# Patient Record
Sex: FEMALE | Race: WHITE | NOT HISPANIC OR LATINO | Employment: FULL TIME | URBAN - METROPOLITAN AREA
[De-identification: names, ages, dates, MRNs, and addresses within clinical notes are randomized per-mention and may not be internally consistent; named-entity substitution may affect disease eponyms.]

---

## 2020-10-07 ENCOUNTER — OFFICE VISIT (OUTPATIENT)
Dept: GASTROENTEROLOGY | Facility: CLINIC | Age: 50
End: 2020-10-07
Payer: COMMERCIAL

## 2020-10-07 VITALS
HEIGHT: 65 IN | WEIGHT: 122 LBS | DIASTOLIC BLOOD PRESSURE: 82 MMHG | SYSTOLIC BLOOD PRESSURE: 116 MMHG | TEMPERATURE: 96.7 F | HEART RATE: 59 BPM | BODY MASS INDEX: 20.33 KG/M2

## 2020-10-07 DIAGNOSIS — R19.5 LOOSE STOOLS: ICD-10-CM

## 2020-10-07 DIAGNOSIS — R12 HEARTBURN: Primary | ICD-10-CM

## 2020-10-07 DIAGNOSIS — R15.9 INCONTINENCE OF FECES, UNSPECIFIED FECAL INCONTINENCE TYPE: ICD-10-CM

## 2020-10-07 DIAGNOSIS — R13.19 OTHER DYSPHAGIA: ICD-10-CM

## 2020-10-07 DIAGNOSIS — Z12.11 COLON CANCER SCREENING: ICD-10-CM

## 2020-10-07 PROCEDURE — 99204 OFFICE O/P NEW MOD 45 MIN: CPT | Performed by: NURSE PRACTITIONER

## 2020-10-07 RX ORDER — VALACYCLOVIR HYDROCHLORIDE 1 G/1
1000 TABLET, FILM COATED ORAL AS NEEDED
COMMUNITY

## 2020-10-09 ENCOUNTER — TELEMEDICINE (OUTPATIENT)
Dept: GASTROENTEROLOGY | Facility: CLINIC | Age: 50
End: 2020-10-09

## 2020-10-09 VITALS — WEIGHT: 122 LBS | HEIGHT: 65 IN | BODY MASS INDEX: 20.33 KG/M2

## 2020-10-09 DIAGNOSIS — R15.9 INCONTINENCE OF FECES, UNSPECIFIED FECAL INCONTINENCE TYPE: Primary | ICD-10-CM

## 2020-10-09 RX ORDER — SODIUM PICOSULFATE, MAGNESIUM OXIDE, AND ANHYDROUS CITRIC ACID 10; 3.5; 12 MG/160ML; G/160ML; G/160ML
LIQUID ORAL
Qty: 2 BOTTLE | Refills: 0 | Status: SHIPPED | OUTPATIENT
Start: 2020-10-09 | End: 2020-12-10 | Stop reason: ALTCHOICE

## 2020-10-16 ENCOUNTER — HOSPITAL ENCOUNTER (OUTPATIENT)
Dept: GASTROENTEROLOGY | Facility: AMBULATORY SURGERY CENTER | Age: 50
Discharge: HOME | End: 2020-10-16
Payer: COMMERCIAL

## 2020-10-16 ENCOUNTER — ANESTHESIA EVENT (OUTPATIENT)
Dept: GASTROENTEROLOGY | Facility: AMBULATORY SURGERY CENTER | Age: 50
End: 2020-10-16

## 2020-10-16 ENCOUNTER — ANESTHESIA (OUTPATIENT)
Dept: GASTROENTEROLOGY | Facility: AMBULATORY SURGERY CENTER | Age: 50
End: 2020-10-16

## 2020-10-16 VITALS
SYSTOLIC BLOOD PRESSURE: 134 MMHG | OXYGEN SATURATION: 100 % | HEART RATE: 58 BPM | RESPIRATION RATE: 15 BRPM | TEMPERATURE: 97.5 F | DIASTOLIC BLOOD PRESSURE: 93 MMHG

## 2020-10-16 VITALS — HEART RATE: 54 BPM

## 2020-10-16 DIAGNOSIS — R12 HEARTBURN: ICD-10-CM

## 2020-10-16 DIAGNOSIS — R13.19 OTHER DYSPHAGIA: ICD-10-CM

## 2020-10-16 DIAGNOSIS — Z12.11 COLON CANCER SCREENING: ICD-10-CM

## 2020-10-16 PROCEDURE — 45380 COLONOSCOPY AND BIOPSY: CPT | Performed by: INTERNAL MEDICINE

## 2020-10-16 PROCEDURE — 88305 TISSUE EXAM BY PATHOLOGIST: CPT | Performed by: PATHOLOGY

## 2020-10-16 PROCEDURE — 43450 DILATE ESOPHAGUS 1/MULT PASS: CPT | Performed by: INTERNAL MEDICINE

## 2020-10-16 PROCEDURE — 43239 EGD BIOPSY SINGLE/MULTIPLE: CPT | Performed by: INTERNAL MEDICINE

## 2020-10-16 RX ORDER — SODIUM CHLORIDE 9 MG/ML
50 INJECTION, SOLUTION INTRAVENOUS CONTINUOUS
Status: DISCONTINUED | OUTPATIENT
Start: 2020-10-16 | End: 2020-10-16

## 2020-10-16 RX ORDER — PROPOFOL 10 MG/ML
INJECTION, EMULSION INTRAVENOUS AS NEEDED
Status: DISCONTINUED | OUTPATIENT
Start: 2020-10-16 | End: 2020-10-16

## 2020-10-16 RX ORDER — LIDOCAINE HYDROCHLORIDE 10 MG/ML
INJECTION, SOLUTION EPIDURAL; INFILTRATION; INTRACAUDAL; PERINEURAL AS NEEDED
Status: DISCONTINUED | OUTPATIENT
Start: 2020-10-16 | End: 2020-10-16

## 2020-10-16 RX ADMIN — SODIUM CHLORIDE 50 ML/HR: 9 INJECTION, SOLUTION INTRAVENOUS at 13:03

## 2020-10-16 RX ADMIN — PROPOFOL 10 MG: 10 INJECTION, EMULSION INTRAVENOUS at 13:21

## 2020-10-16 RX ADMIN — PROPOFOL 20 MG: 10 INJECTION, EMULSION INTRAVENOUS at 13:25

## 2020-10-16 RX ADMIN — PROPOFOL 10 MG: 10 INJECTION, EMULSION INTRAVENOUS at 13:26

## 2020-10-16 RX ADMIN — LIDOCAINE HYDROCHLORIDE 50 MG: 10 INJECTION, SOLUTION EPIDURAL; INFILTRATION; INTRACAUDAL; PERINEURAL at 13:10

## 2020-10-16 RX ADMIN — PROPOFOL 30 MG: 10 INJECTION, EMULSION INTRAVENOUS at 13:20

## 2020-10-16 RX ADMIN — PROPOFOL 20 MG: 10 INJECTION, EMULSION INTRAVENOUS at 13:30

## 2020-10-16 RX ADMIN — PROPOFOL 20 MG: 10 INJECTION, EMULSION INTRAVENOUS at 13:32

## 2020-10-16 RX ADMIN — PROPOFOL 30 MG: 10 INJECTION, EMULSION INTRAVENOUS at 13:12

## 2020-10-16 RX ADMIN — PROPOFOL 30 MG: 10 INJECTION, EMULSION INTRAVENOUS at 13:11

## 2020-10-16 RX ADMIN — PROPOFOL 50 MG: 10 INJECTION, EMULSION INTRAVENOUS at 13:10

## 2020-10-16 RX ADMIN — PROPOFOL 20 MG: 10 INJECTION, EMULSION INTRAVENOUS at 13:28

## 2020-10-16 RX ADMIN — PROPOFOL 30 MG: 10 INJECTION, EMULSION INTRAVENOUS at 13:14

## 2020-10-16 RX ADMIN — PROPOFOL 30 MG: 10 INJECTION, EMULSION INTRAVENOUS at 13:17

## 2020-10-16 RX ADMIN — PROPOFOL 30 MG: 10 INJECTION, EMULSION INTRAVENOUS at 13:22

## 2020-12-10 ENCOUNTER — TELEMEDICINE (OUTPATIENT)
Dept: GASTROENTEROLOGY | Facility: CLINIC | Age: 50
End: 2020-12-10
Payer: COMMERCIAL

## 2020-12-10 VITALS — BODY MASS INDEX: 20.99 KG/M2 | WEIGHT: 126 LBS | HEIGHT: 65 IN

## 2020-12-10 DIAGNOSIS — K20.80 LYMPHOCYTIC ESOPHAGITIS: ICD-10-CM

## 2020-12-10 DIAGNOSIS — R19.5 LOOSE STOOLS: ICD-10-CM

## 2020-12-10 DIAGNOSIS — R13.19 OTHER DYSPHAGIA: ICD-10-CM

## 2020-12-10 DIAGNOSIS — R15.9 INCONTINENCE OF FECES, UNSPECIFIED FECAL INCONTINENCE TYPE: ICD-10-CM

## 2020-12-10 DIAGNOSIS — Z86.010 HISTORY OF COLON POLYPS: ICD-10-CM

## 2020-12-10 DIAGNOSIS — K21.9 GASTROESOPHAGEAL REFLUX DISEASE WITHOUT ESOPHAGITIS: Primary | ICD-10-CM

## 2020-12-10 PROBLEM — Z86.0100 HISTORY OF COLON POLYPS: Status: ACTIVE | Noted: 2020-12-10

## 2020-12-10 PROCEDURE — 99214 OFFICE O/P EST MOD 30 MIN: CPT | Performed by: NURSE PRACTITIONER

## 2020-12-10 RX ORDER — FAMOTIDINE 40 MG/1
40 TABLET, FILM COATED ORAL DAILY PRN
Qty: 30 TABLET | Refills: 3 | Status: SHIPPED | OUTPATIENT
Start: 2020-12-10 | End: 2021-03-08

## 2021-03-06 DIAGNOSIS — K21.9 GASTROESOPHAGEAL REFLUX DISEASE WITHOUT ESOPHAGITIS: ICD-10-CM

## 2021-03-08 RX ORDER — FAMOTIDINE 40 MG/1
TABLET, FILM COATED ORAL
Qty: 90 TABLET | Refills: 1 | Status: SHIPPED | OUTPATIENT
Start: 2021-03-08 | End: 2022-07-21

## 2022-07-21 ENCOUNTER — PATIENT MESSAGE (OUTPATIENT)
Dept: OBGYN CLINIC | Facility: CLINIC | Age: 52
End: 2022-07-21

## 2022-07-21 ENCOUNTER — ANNUAL EXAM (OUTPATIENT)
Dept: OBGYN CLINIC | Facility: CLINIC | Age: 52
End: 2022-07-21
Payer: COMMERCIAL

## 2022-07-21 VITALS
BODY MASS INDEX: 22.99 KG/M2 | WEIGHT: 138 LBS | HEIGHT: 65 IN | SYSTOLIC BLOOD PRESSURE: 102 MMHG | DIASTOLIC BLOOD PRESSURE: 68 MMHG

## 2022-07-21 DIAGNOSIS — Z12.4 CERVICAL CANCER SCREENING: ICD-10-CM

## 2022-07-21 DIAGNOSIS — N95.2 ATROPHIC VAGINITIS: Primary | ICD-10-CM

## 2022-07-21 DIAGNOSIS — K58.9 IRRITABLE BOWEL SYNDROME, UNSPECIFIED TYPE: ICD-10-CM

## 2022-07-21 DIAGNOSIS — Z12.31 ENCOUNTER FOR SCREENING MAMMOGRAM FOR MALIGNANT NEOPLASM OF BREAST: ICD-10-CM

## 2022-07-21 DIAGNOSIS — N95.2 ATROPHIC VAGINITIS: ICD-10-CM

## 2022-07-21 DIAGNOSIS — Z01.419 ENCOUNTER FOR GYNECOLOGICAL EXAMINATION WITHOUT ABNORMAL FINDING: Primary | ICD-10-CM

## 2022-07-21 PROCEDURE — 99396 PREV VISIT EST AGE 40-64: CPT | Performed by: OBSTETRICS & GYNECOLOGY

## 2022-07-21 PROCEDURE — 0503F POSTPARTUM CARE VISIT: CPT | Performed by: OBSTETRICS & GYNECOLOGY

## 2022-07-21 RX ORDER — ESTRADIOL 0.1 MG/G
CREAM VAGINAL
Qty: 30 G | Refills: 1 | Status: SHIPPED | OUTPATIENT
Start: 2022-07-21 | End: 2022-07-22

## 2022-07-21 NOTE — PROGRESS NOTES
[id number]   [address], [address], [facility], [place]    ASSESSMENT/PLAN: Kashmir Naidu is a 45 y o  No obstetric history on file  who presents for annual gynecologic exam     Encounter for routine gynecologic examination  - Routine well woman exam completed today  - Cervical Cancer Screening: Current ASCCP Guidelines reviewed  Last Pap: 03/28/2021   Next Pap Due: today  - Contraceptive counseling discussed  Current contraception: none:   - Breast Cancer Screening: Last Mammogram  Order given   - Colorectal cancer screening was not ordered  - The following were reviewed in today's visit: breast self exam, mammography screening ordered, menopause, adequate intake of calcium and vitamin D, exercise, healthy diet and colonoscopy discussed     Additional problems addressed during this visit:  1  Encounter for gynecological examination without abnormal finding    2  Encounter for screening mammogram for malignant neoplasm of breast  -     Mammo screening bilateral w 3d & cad; Future; Expected date: 07/21/2023    3  Irritable bowel syndrome, unspecified type    4  Cervical cancer screening  -     IGP, Aptima HPV, Rfx 16/18,45        CC:  Annual Gynecologic Examination    HPI: Kashmir Naidu is a 45 y o  No obstetric history on file  who presents for annual gynecologic examination  44 yo here for wellness exam   No bleeding, bloating or satiety  + painful intercourse   Used  Compounded estrogen   Butstopped  The following portions of the patient's history were reviewed and updated as appropriate: She  has a past medical history of Herpes simplex, History of screening mammography (12/04/2019), and Irritable bowel syndrome  She  has a past surgical history that includes Liposuction; Colonoscopy (10/16/2020); and Upper gastrointestinal endoscopy (10/16/2020)  Her family history is not on file  She  reports that she has quit smoking   She has never used smokeless tobacco  She reports current alcohol use  She reports that she does not use drugs  Current Outpatient Medications   Medication Sig Dispense Refill    valACYclovir (VALTREX) 1,000 mg tablet Take 1,000 mg by mouth as needed       No current facility-administered medications for this visit  She has No Known Allergies       Review of Systems:  All systems normal except as noted in HPI          Objective:  /68   Ht 5' 4 5" (1 638 m)   Wt 62 6 kg (138 lb)   LMP 10/16/2017   BMI 23 32 kg/m²    Physical Exam  Vitals and nursing note reviewed  Constitutional:       Appearance: Normal appearance  HENT:      Head: Normocephalic  Cardiovascular:      Rate and Rhythm: Normal rate and regular rhythm  Pulses: Normal pulses  Pulmonary:      Effort: Pulmonary effort is normal       Breath sounds: Normal breath sounds  Chest:      Chest wall: No mass, lacerations, swelling, tenderness or edema  Breasts: Rogers Score is 4  Breasts are symmetrical       Right: Normal  No swelling, bleeding, inverted nipple, mass, nipple discharge, skin change, tenderness, axillary adenopathy or supraclavicular adenopathy  Left: No swelling, bleeding, inverted nipple, mass, nipple discharge, skin change, tenderness, axillary adenopathy or supraclavicular adenopathy  Abdominal:      General: Abdomen is flat  Bowel sounds are normal       Palpations: Abdomen is soft  Genitourinary:     General: Normal vulva  Exam position: Lithotomy position  Pubic Area: No rash  Rogers stage (genital): 4       Labia:         Right: No rash, tenderness or lesion  Left: No rash, tenderness or lesion  Urethra: No urethral pain, urethral swelling or urethral lesion  Vagina: Tenderness present  No erythema  Cervix: No cervical motion tenderness        Uterus: Normal        Adnexa: Right adnexa normal and left adnexa normal       Rectum: Normal       Comments: vagina  Pale  Pink    Tight   introitus Musculoskeletal:         General: Normal range of motion  Cervical back: Neck supple  Lymphadenopathy:      Upper Body:      Right upper body: No supraclavicular, axillary or pectoral adenopathy  Left upper body: No supraclavicular, axillary or pectoral adenopathy  Lower Body: No right inguinal adenopathy  No left inguinal adenopathy  Skin:     General: Skin is warm and dry  Neurological:      General: No focal deficit present  Mental Status: She is alert and oriented to person, place, and time  Psychiatric:         Mood and Affect: Mood normal          Behavior: Behavior normal          Thought Content:  Thought content normal          Judgment: Judgment normal

## 2022-07-22 RX ORDER — ESTRADIOL 0.1 MG/G
CREAM VAGINAL
Qty: 30 G | Refills: 3 | Status: SHIPPED | OUTPATIENT
Start: 2022-07-22

## 2022-07-28 LAB
CYTOLOGIST CVX/VAG CYTO: NORMAL
DX ICD CODE: NORMAL
HPV I/H RISK 4 DNA CVX QL PROBE+SIG AMP: NEGATIVE
OTHER STN SPEC: NORMAL
PATH REPORT.FINAL DX SPEC: NORMAL
SL AMB NOTE:: NORMAL
SL AMB SPECIMEN ADEQUACY: NORMAL
SL AMB TEST METHODOLOGY: NORMAL

## 2022-08-18 DIAGNOSIS — Z12.31 ENCOUNTER FOR SCREENING MAMMOGRAM FOR MALIGNANT NEOPLASM OF BREAST: ICD-10-CM

## 2023-08-24 ENCOUNTER — OFFICE VISIT (OUTPATIENT)
Dept: GASTROENTEROLOGY | Facility: CLINIC | Age: 53
End: 2023-08-24
Payer: COMMERCIAL

## 2023-08-24 VITALS
HEIGHT: 65 IN | BODY MASS INDEX: 18.83 KG/M2 | TEMPERATURE: 98.3 F | HEART RATE: 56 BPM | DIASTOLIC BLOOD PRESSURE: 75 MMHG | SYSTOLIC BLOOD PRESSURE: 105 MMHG | OXYGEN SATURATION: 100 % | WEIGHT: 113 LBS

## 2023-08-24 DIAGNOSIS — K76.0 FATTY LIVER: ICD-10-CM

## 2023-08-24 DIAGNOSIS — R79.89 LFTS ABNORMAL: Primary | ICD-10-CM

## 2023-08-24 DIAGNOSIS — F10.11 HISTORY OF ALCOHOL ABUSE: ICD-10-CM

## 2023-08-24 PROCEDURE — 99213 OFFICE O/P EST LOW 20 MIN: CPT | Performed by: INTERNAL MEDICINE

## 2023-08-24 RX ORDER — FLUTICASONE PROPIONATE 50 MCG
1 SPRAY, SUSPENSION (ML) NASAL DAILY
COMMUNITY

## 2023-08-24 NOTE — PROGRESS NOTES
[facility] - Outpatient Follow-up Note  Michelle Byers 52 y.o. female MRN: [id number]  Encounter: [id number]          ASSESSMENT AND PLAN:      Mildly elevated transaminases in the 40s with mild hepatomegaly on ultrasound. · Patient has no physical, laboratory or radiologic evidence of advanced liver disease or cirrhosis. · Patient has a long history of alcohol overuse, finally quitting in September of last year. · At this point in time, I will request blood work to rule out more common causes of chronic liver disease including viral and autoimmune hepatitis, alpha-1 antitrypsin deficiency and hemochromatosis. · I will also request elastography to noninvasively stage hepatic fibrosis and rule out advanced fibrosis in a patient that has had long-term alcohol overuse. · I counseled her to continue her healthy lifestyle and avoid alcohol. · I did inform her that Valtrex may cause elevated transaminases and to keep track of when she takes it in relation to future blood work. · She will return in 6 weeks to review the results. FOLLOW-UP:  Return in about 6 weeks (around 10/5/2023). VISIT DIAGNOSES AND ORDERS:      1. LFTs abnormal    2. Fatty liver    3.  History of alcohol abuse      Orders Placed This Encounter   Procedures   • US elastography   • CBC and differential   • Protime-INR   • JOSE Screen w/ Reflex to Titer/Pattern   • Antimitochondrial antibody   • Anti-smooth muscle antibody, IgG   • Hepatitis panel, acute   • Hepatitis B surface antibody   • Hepatitis A antibody, total   • Celiac Disease Antibody Profile   • IgG, IgA, IgM   • Alpha 1 Antitrypsin Phenotype   • HIDALGO Fibrosure   • Comprehensive metabolic panel   • Lipid panel     ______________________________________________________________________    SUBJECTIVE:         Ms. Michelle Byers is a 52 y.o. female with medical history as outlined below including a right bowel syndrome and a personal history of colon emma, who comes in today for initial consultation with hepatology after being found to have mildly elevated transaminases by her PCP. Blood work drawn on July 27, 2023 showed AST of 42 and an ALT of 47 with otherwise normal comprehensive metabolic panel. She had a subsequent abdominal ultrasound which showed her liver to be mildly enlarged without any clear evidence of fatty appearing liver. Patient states she had a history of heavy alcohol use for 10 years until September of last year. She states she drank 1-1-1/2 bottles of wine daily. She states she routinely follows with her PCP and annual blood work has never shown elevated liver enzymes until this most recent blood work. Ms. Piedad Moses denies recent or history of yellow eyes/skin, dark urine, GI bleeding, abdominal distention with fluid, lower extremity swelling, easy bruising, excessive bleeding, pruritus or confusion. She has a history of GERD and dysphagia and was last seen by her [facility] in late 2020. Fortunately she has not had any recurrence of the symptoms since that time. She does still complain of irritable bowel syndrome. Her last colonoscopy was in October 2020 showing a sessile polyp in the rectum, recall placed for 2030. She uses Valtrex as needed for cold sores but otherwise denies any prescribed medication other than vitamins. She denies over-the-counter medication use. She denies herbs, holistic medications or recreational drug use. Does not recall using Valtrex in a short period of time before having her blood work drawn. REVIEW OF SYSTEMS     Review of Systems   All other systems reviewed and are negative.       Historical Information   Patient Active Problem List   Diagnosis   • Irritable bowel syndrome   • History of colon polyps     Social History     Substance and Sexual Activity   Alcohol Use Yes    Comment: social     Social History     Substance and Sexual Activity   Drug Use Not Currently Social History     Tobacco Use   Smoking Status Former   Smokeless Tobacco Never       Meds/Allergies       Current Outpatient Medications:   •  fluticasone (FLONASE) 50 mcg/act nasal spray  •  valACYclovir (VALTREX) 1,000 mg tablet  •  estradiol (ESTRACE) 0.1 mg/g vaginal cream    No Known Allergies        Objective     Blood pressure 105/75, pulse 56, temperature 98.3 °F (36.8 °C), temperature source Oral, height 5' 5" (1.651 m), weight 51.3 kg (113 lb), last menstrual period 10/16/2017, SpO2 100 %. Body mass index is 18.8 kg/m². PHYSICAL EXAM:      Physical Exam  Vitals reviewed. Constitutional:       General: She is not in acute distress. Appearance: Normal appearance. She is not ill-appearing. HENT:      Head: Normocephalic and atraumatic. Nose: Nose normal.      Mouth/Throat:      Mouth: Mucous membranes are moist.      Pharynx: Oropharynx is clear. Eyes:      General: No scleral icterus. Extraocular Movements: Extraocular movements intact. Cardiovascular:      Rate and Rhythm: Normal rate and regular rhythm. Heart sounds: No murmur heard. Pulmonary:      Effort: Pulmonary effort is normal. No respiratory distress. Breath sounds: Normal breath sounds. Abdominal:      General: Abdomen is flat. Palpations: Abdomen is soft. There is no shifting dullness, fluid wave, hepatomegaly or splenomegaly. Tenderness: There is no abdominal tenderness. Hernia: No hernia is present. Genitourinary:     Comments: deferred  Musculoskeletal:         General: No swelling. Normal range of motion. Cervical back: Normal range of motion and neck supple. No tenderness. Skin:     General: Skin is warm. Coloration: Skin is not jaundiced. Findings: No bruising or rash. Neurological:      General: No focal deficit present. Mental Status: She is alert and oriented to person, place, and time.    Psychiatric:         Mood and Affect: Mood normal.         Lab Results:   No results found for: "NA", "K", "CO2", "CL", "BUN", "CREATININE", "GLUCOSE"  No results found for: "WBC", "HGB", "HCT", "MCV", "PLT"  No results found for: "TP", "AST", "ALT", "BILITOT", "BILIDIR", "INR"   No results found for: "AFP", "IRON", "Ehrhardt Crutch", "FERRITIN"  No results found for: "CHOL", "HDL", "TRIG", "LDL"      Radiology Results:   No results found.       Mj Alcala MD

## 2023-08-24 NOTE — PATIENT INSTRUCTIONS
Have the blood work done at your earliest convenience. Schedule an ultrasound elastography for determining liver scarring. Continue with alcohol avoidance.

## 2023-09-13 ENCOUNTER — HOSPITAL ENCOUNTER (OUTPATIENT)
Dept: ULTRASOUND IMAGING | Facility: HOSPITAL | Age: 53
Discharge: HOME | End: 2023-09-13
Attending: INTERNAL MEDICINE
Payer: COMMERCIAL

## 2023-09-13 DIAGNOSIS — F10.11 HISTORY OF ALCOHOL ABUSE: ICD-10-CM

## 2023-09-13 DIAGNOSIS — R79.89 LFTS ABNORMAL: ICD-10-CM

## 2023-09-13 DIAGNOSIS — K76.0 FATTY LIVER: ICD-10-CM

## 2023-09-13 LAB — HCV AB SER-ACNC: NORMAL

## 2023-09-13 PROCEDURE — 76981 USE PARENCHYMA: CPT

## 2023-09-20 NOTE — RESULT ENCOUNTER NOTE
Dr. Rodney Zamorano,  Ms. Jasmine Radon results were normal and she has been notified via [address] [place] [address].

## 2023-09-22 ENCOUNTER — OFFICE VISIT (OUTPATIENT)
Dept: GASTROENTEROLOGY | Facility: CLINIC | Age: 53
End: 2023-09-22
Payer: COMMERCIAL

## 2023-09-22 VITALS
DIASTOLIC BLOOD PRESSURE: 82 MMHG | HEART RATE: 64 BPM | SYSTOLIC BLOOD PRESSURE: 123 MMHG | BODY MASS INDEX: 18.36 KG/M2 | WEIGHT: 110.2 LBS | HEIGHT: 65 IN

## 2023-09-22 DIAGNOSIS — F10.11 HISTORY OF ALCOHOL ABUSE: ICD-10-CM

## 2023-09-22 DIAGNOSIS — R74.8 ELEVATED LIVER ENZYMES: Primary | ICD-10-CM

## 2023-09-22 DIAGNOSIS — Z12.11 SCREENING FOR COLON CANCER: ICD-10-CM

## 2023-09-22 LAB
A1AT PHENOTYP SERPL-IMP: NORMAL
ALBUMIN SERPL-MCNC: 4.7 G/DL (ref 3.6–5.1)
ALBUMIN/GLOB SERPL: 1.8 (CALC) (ref 1–2.5)
ALP SERPL-CCNC: 83 U/L (ref 37–153)
ALT SERPL-CCNC: 37 U/L (ref 6–29)
ANA SER QL IF: NEGATIVE
AST SERPL-CCNC: 33 U/L (ref 10–35)
BASOPHILS # BLD AUTO: 71 CELLS/UL (ref 0–200)
BASOPHILS NFR BLD AUTO: 1.4 %
BILIRUB SERPL-MCNC: 0.5 MG/DL (ref 0.2–1.2)
BUN SERPL-MCNC: 19 MG/DL (ref 7–25)
BUN/CREAT SERPL: ABNORMAL (CALC) (ref 6–22)
CALCIUM SERPL-MCNC: 10.1 MG/DL (ref 8.6–10.4)
CHLORIDE SERPL-SCNC: 103 MMOL/L (ref 98–110)
CHOLEST SERPL-MCNC: 190 MG/DL
CHOLEST/HDLC SERPL: 2.6 (CALC)
CO2 SERPL-SCNC: 30 MMOL/L (ref 20–32)
CREAT SERPL-MCNC: 0.78 MG/DL (ref 0.5–1.03)
EOSINOPHIL # BLD AUTO: 179 CELLS/UL (ref 15–500)
EOSINOPHIL NFR BLD AUTO: 3.5 %
ERYTHROCYTE [DISTWIDTH] IN BLOOD BY AUTOMATED COUNT: 12.5 % (ref 11–15)
GFR/BSA.PRED SERPLBLD CYS-BASED-ARV: 90 ML/MIN/1.73M2
GLOBULIN SER CALC-MCNC: 2.6 G/DL (CALC) (ref 1.9–3.7)
GLUCOSE SERPL-MCNC: 81 MG/DL (ref 65–99)
HAV AB SER QL IA: REACTIVE
HAV IGM SERPL QL IA: NORMAL
HBV CORE IGM SERPL QL IA: NORMAL
HBV SURFACE AB SERPL IA-ACNC: <5 MIU/ML
HBV SURFACE AG SERPL QL IA: NORMAL
HCT VFR BLD AUTO: 38.3 % (ref 35–45)
HCV AB SERPL QL IA: NORMAL
HDLC SERPL-MCNC: 74 MG/DL
HGB BLD-MCNC: 12.5 G/DL (ref 11.7–15.5)
INR PPP: 1
LDLC SERPL CALC-MCNC: 100 MG/DL (CALC)
LYMPHOCYTES # BLD AUTO: 1387 CELLS/UL (ref 850–3900)
LYMPHOCYTES NFR BLD AUTO: 27.2 %
MCH RBC QN AUTO: 31.3 PG (ref 27–33)
MCHC RBC AUTO-ENTMCNC: 32.6 G/DL (ref 32–36)
MCV RBC AUTO: 95.8 FL (ref 80–100)
MITOCHONDRIA AB SER QL IF: NEGATIVE
MONOCYTES # BLD AUTO: 377 CELLS/UL (ref 200–950)
MONOCYTES NFR BLD AUTO: 7.4 %
NEUTROPHILS # BLD AUTO: 3086 CELLS/UL (ref 1500–7800)
NEUTROPHILS NFR BLD AUTO: 60.5 %
NONHDLC SERPL-MCNC: 116 MG/DL (CALC)
PLATELET # BLD AUTO: 286 THOUSAND/UL (ref 140–400)
PMV BLD REES-ECKER: 11.6 FL (ref 7.5–12.5)
POTASSIUM SERPL-SCNC: 4.1 MMOL/L (ref 3.5–5.3)
PROT SERPL-MCNC: 7.3 G/DL (ref 6.1–8.1)
PROTHROMBIN TIME: 10.5 SEC (ref 9–11.5)
RBC # BLD AUTO: 4 MILLION/UL (ref 3.8–5.1)
SMOOTH MUSCLE AB SER QL IF: NEGATIVE
SODIUM SERPL-SCNC: 141 MMOL/L (ref 135–146)
TRIGL SERPL-MCNC: 70 MG/DL
WBC # BLD AUTO: 5.1 THOUSAND/UL (ref 3.8–10.8)

## 2023-09-22 PROCEDURE — 99213 OFFICE O/P EST LOW 20 MIN: CPT | Performed by: FAMILY MEDICINE

## 2023-09-22 NOTE — PROGRESS NOTES
[facility] - Outpatient Follow-up Note  Michelle Capone 52 y.o. female MRN: [id number]  Encounter: [id number]          ASSESSMENT AND PLAN:      1. Elevated liver enzymes  2. History of alcohol abuse  Patient with previously normal liver function tests noted to have mildly elevated transaminases in July 2023 prompting a US notable for hepatomegaly without obvious evidence of fatty liver disease. Reports a history of excessive alcohol use sober since September 2022. Recent serologies negative for viral hepatitis and AIH. Remainder of serologies including celiac disease panel, HIDALGO FibroSure, quant immunoglobulins, A1AT phenotype and iron studies were not drawn by [facility]. U/S elastography notable for F0-F1 fibrosis. No clinical evidence of chronic liver disease. Fortunately, patient's transaminases have improved on repeat serologies now with a very mildly elevated serum ALT (37). Upon further questioning, patient had a Tdap vaccine 2 weeks prior to first being noticed to have elevated liver enzymes. Although rare, it is possible that this is caused her abnormal liver function tests. Recommend she complete the remainder of her serologic evaluation as previously ordered by Dr. Betsey East. She will also repeat a CMP x1 month. So long as the remainder of her workup is unremarkable and her liver function tests remain stable/normalize, no additional work-up is necessary but would recommend she have her liver function tests monitored q3-6 months with routine labs. Patient also has partial immunity to hepatitis B and would recommend she receive a hepatitis B booster either through her PCP or local pharmacy. In the meantime, she is aware to maintain a healthy weight, avoid hepatotoxic medications and continue with complete alcohol abstinence. - Hepatic function panel; Future  - Hepatic function panel    3.  Screening for colon cancer  Patient is up-to-date with her CRC screening. Colonoscopy (10/2020) notable for 1 polyp (hyperplastic). Recommended repeat x10 years. Follow-up PRN pending serologies x1 month.     ______________________________________________________________________    SUBJECTIVE: Patient is a 52 y.o. female with PMH significant for colonic polyps, IBS, cold sores and a history of alcohol abuse who presents today for follow-up regarding elevated liver enzymes. Patient is familiar to [facility] last seen by Dr. Romy Franco on 8/24/2023. Patient had labs drawn in July 2023 showing mildly elevated transaminases (AST 42, ALT 47). Prior to this her hepatic function has been historically within normal limits. This prompted an abdominal U/S notable for mild hepatomegaly without obvious evidence of fatty liver disease. Patient reported a history of heavy alcohol use drinking approximately 1-1.5 bottles of wine daily until recently quitting in September 2022. Patient completed a full serologic evaluation notable for improved transaminases but ever so slightly elevated serum ALT (37). Negative for viral hepatitis. Normal autoimmune serologies (AMA, ASMA and JOSE). Patient is immune to hepatitis A and has partial immunity to hepatitis B. Unfortunately, patient's celiac disease panel, HIDALGO fibrosure, immunoglobulins, A1AT phenotype and iron studies were either not drawn or are not available for review. U/S elastography with F0-F1 fibrosis. Denies a personal history of chronic liver disease. Denies a family history of liver disease or liver related cancers. Denies autoimmune conditions. Denies taking any regular medications but does take Valtrex and Flonase as needed. Also takes over-the-counter gummy melatonin, vitamin and hair skin and nail supplements. Denies recent illness or antibiotic use. Denies excessive Tylenol use. She did have a Tdap vaccine on July 5 prior to the arrival of her grandson.       REVIEW OF SYSTEMS IS OTHERWISE NEGATIVE. Historical Information   Past Medical History:   Diagnosis Date   • Herpes simplex    • History of screening mammography 12/04/2019    [facility] 1.   • Irritable bowel syndrome      Past Surgical History:   Procedure Laterality Date   • COLONOSCOPY  10/16/2020    a sessile polyp in the rectum, biopsy showed hyperplastic polyp, small hemorrhiods, normal mucosa, biopsies negative for microscopic colitis or dysplasia   A 10 year recall was advised, October 2030. • LIPOSUCTION     • UPPER GASTROINTESTINAL ENDOSCOPY  10/16/2020    mild antral gastritis, mild concentric rings in the esophagus, dilated to 48 [place], normal duodenum. Biopsy showed reactive antral gastropathy and lymphocytic esophagitis, negative for H pylori, intestinal metaplasia or dysplasia, EOE. Social History   Social History     Substance and Sexual Activity   Alcohol Use Yes    Comment: social     Social History     Substance and Sexual Activity   Drug Use Not Currently     Social History     Tobacco Use   Smoking Status Former   Smokeless Tobacco Never     Family History   Problem Relation Age of Onset   • Colon cancer Neg Hx    • Colon polyps Neg Hx    • Breast cancer Neg Hx    • Uterine cancer Neg Hx    • Ovarian cancer Neg Hx        Meds/Allergies       Current Outpatient Medications:   •  fluticasone (FLONASE) 50 mcg/act nasal spray  •  valACYclovir (VALTREX) 1,000 mg tablet  •  estradiol (ESTRACE) 0.1 mg/g vaginal cream    No Known Allergies        Objective     Blood pressure 123/82, pulse 64, height 5' 5" (1.651 m), weight 50 kg (110 lb 3.2 oz), last menstrual period 10/16/2017. Body mass index is 18.34 kg/m². PHYSICAL EXAM:      General Appearance:   Alert, cooperative, no distress   HEENT:   Normocephalic, atraumatic, anicteric.      Neck:  Supple, symmetrical, trachea midline   Lungs:   Clear to auscultation bilaterally; no rales, rhonchi or wheezing; respirations unlabored    Heart[de-identified]   Regular rate and rhythm; no murmur, rub, or gallop. Abdomen:   Soft, non-tender, non-distended; normal bowel sounds; no masses, no organomegaly    Genitalia:   Deferred    Rectal:   Deferred    Extremities:  No cyanosis, clubbing or edema    Pulses:  2+ and symmetric    Skin:  No jaundice, rashes, or lesions    Lymph nodes:  No palpable cervical lymphadenopathy        Lab Results:   No visits with results within 1 Day(s) from this visit. Latest known visit with results is:   Orders Only on 09/13/2023   Component Date Value   • HEP C AB 09/13/2023 Non-Reactive          Radiology Results:   US elastography    Result Date: 9/19/2023  Narrative: ELASTOGRAPHY, LIVER ULTRASOUND INDICATION:   R79.89: Other specified abnormal findings of blood chemistry K76.0: Fatty (change of) liver, not elsewhere classified F10.11: Alcohol abuse, in remission. COMPARISON: Report of outside ultrasound abdomen August 2023 TECHNIQUE: Targeted ultrasound of the liver was performed with a curvilinear transducer on a [employer] e10 utilizing 2D SWE. A total of 10 shear wave Elastography samples were obtained. FINDINGS: Shear Wave Elastography sampling was performed to evaluate stiffness changes within the liver associated with fibrosis. E1  3.12 kPa E2  3.17 kPa E3  2.95 kPa E4  3.14 kPa E5  3.37 kPa E6  3.18 kPa E7  3.17 kPa E8  3.25 kPa E9  3.31 kPa E10 3.44 kPa E median:  3.18 kPa. The corresponding Metavir score is F0-F1(absent or mild fibrosis), range 0-8.26kPa. <1.66 m/s. IQR/median:  4.5 %. (IQR of less than 30% is considered a satisfactory data set). Note: that the stage of liver fibrosis may be overestimated by clinical conditions unrelated to fibrosis, including but not limited to, nonfasting, hepatic inflammation, vascular congestion, biliary obstruction, and infiltrative liver disease. Furthermore, in some patients with NAFLD, the cut-off values for compensated advanced chronic liver disease may be lower (7-9 kPa).  In causes other than viral hepatitis and nonalcoholic fatty liver disease, the cut-off values are not well established. When comparing measurements across time, a clinically significant change should be considered when the delta change is greater than 10%. In all these conditions, however, liver stiffness values within the normal range exclude significant liver fibrosis. Ultrasound-guided attenuation parameter (UGAP) Liver Steatosis Grading A1  0.59 dB/cm/MHz A2  0.52 dB/cm/MHz A3  0.6 dB/cm/MHz A4  0.62 dB/cm/MHz A5  0.5 dB/cm/MHz A6  0.6 dB/cm/MHz A7  0.62 dB/cm/MHz A8  0.58 dB/cm/MHz A9  0.57 dB/cm/MHz A10 0.52 dB/cm/MHz Attenuation coefficient:  0.59 dB/cm/MHz Liver steatosis grading:  S0 ( <5% steatosis) <0.65 dB/cm/MHz IQR/Med:  11.2 % (Less than or equal to 30% is a satisfactory data set.) Reference White paper for [employer] ultrasound-guided attenuation parameter https://www.[employer]/. au/-/jssmedia/[email or web address]. pdf?la=en-au     Impression: Metavir score: F0 - F1, Absent or Mild Fibrosis According to the updated SRU consensus statement, a liver stiffness of 3.18 kPa, suggesting a high probability of being normal. https://pubs. rsna.org/doi/10.1148/radiol. 9858682148 Liver steatosis grading:  S0 ( <5% steatosis) Workstation performed: [id number]       Ernie Sosa PA-C     **Please note: Dictation voice to text software may have been used in the creation of this record. Occasional wrong word or “sound alike” substitutions may have occurred due to the inherent limitations of voice recognition software. Read the chart carefully and recognize, using context, where substitutions have occurred. **

## 2023-09-22 NOTE — PATIENT INSTRUCTIONS
Please have your labs drawn in one month. Plan to check with [facility] to see if they have the remainder of your lab results. If not, we will contact you to have the remainder of your labs drawn.

## 2023-11-02 ENCOUNTER — ANNUAL EXAM (OUTPATIENT)
Dept: OBGYN CLINIC | Facility: CLINIC | Age: 53
End: 2023-11-02
Payer: COMMERCIAL

## 2023-11-02 VITALS
SYSTOLIC BLOOD PRESSURE: 100 MMHG | BODY MASS INDEX: 18.49 KG/M2 | HEIGHT: 65 IN | WEIGHT: 111 LBS | DIASTOLIC BLOOD PRESSURE: 62 MMHG

## 2023-11-02 DIAGNOSIS — Z01.419 ENCOUNTER FOR GYNECOLOGICAL EXAMINATION WITHOUT ABNORMAL FINDING: Primary | ICD-10-CM

## 2023-11-02 DIAGNOSIS — Z86.010 HISTORY OF COLON POLYPS: ICD-10-CM

## 2023-11-02 DIAGNOSIS — K58.9 IRRITABLE BOWEL SYNDROME, UNSPECIFIED TYPE: ICD-10-CM

## 2023-11-02 DIAGNOSIS — R63.6 PATIENT UNDERWEIGHT: ICD-10-CM

## 2023-11-02 DIAGNOSIS — N95.2 ATROPHIC VAGINITIS: ICD-10-CM

## 2023-11-02 DIAGNOSIS — Z12.31 ENCOUNTER FOR SCREENING MAMMOGRAM FOR MALIGNANT NEOPLASM OF BREAST: ICD-10-CM

## 2023-11-02 DIAGNOSIS — Z12.4 CERVICAL CANCER SCREENING: ICD-10-CM

## 2023-11-02 PROCEDURE — 99396 PREV VISIT EST AGE 40-64: CPT | Performed by: OBSTETRICS & GYNECOLOGY

## 2023-11-02 RX ORDER — ESTRADIOL 0.1 MG/G
CREAM VAGINAL
Qty: 30 G | Refills: 3 | Status: SHIPPED | OUTPATIENT
Start: 2023-11-02

## 2023-11-02 NOTE — PROGRESS NOTES
[address]  [address] [facility], [address], [place], [address] [facility]    ASSESSMENT/PLAN: Peg Skiff is a 52 y.o.  who presents for annual gynecologic exam.    Encounter for routine gynecologic examination  - Routine well woman exam completed today. - Cervical Cancer Screening: Current ASCCP Guidelines reviewed. Last Pap: 2022 .  - Contraceptive counseling discussed. Current contraception: none or condoms:   - Breast Cancer Screening: Last Mammogram 2022,   - Colorectal cancer screening was not ordered. - The following were reviewed in today's visit: breast self exam, mammography screening ordered, menopause, exercise, healthy diet, and colonoscopy discussed and ordered    Additional problems addressed during this visit:  1. Encounter for gynecological examination without abnormal finding    2. Encounter for screening mammogram for malignant neoplasm of breast  -     Mammo screening bilateral w 3d & cad; Future    3. Atrophic vaginitis  -     estradiol (ESTRACE) 0.1 mg/g vaginal cream; Compounded estradiol cream  0.01% one gram  2-3 times  weekly    4. Patient underweight  Comments:  Bone health reviewed. 5. Cervical cancer screening    6. History of colon polyps    7. Irritable bowel syndrome, unspecified type        CC:  Annual Gynecologic Examination    HPI: Peg Skiff is a 52 y.o.  who presents for annual gynecologic examination. 53 yo here  for wellness exam,. No bleeding, bloating  or satiety. + vaginal dryness. .      The following portions of the patient's history were reviewed and updated as appropriate: She  has a past medical history of Herpes simplex, History of screening mammography (2019), History of screening mammography (2022), and Irritable bowel syndrome. She  has a past surgical history that includes Liposuction; Colonoscopy (10/16/2020); and Upper gastrointestinal endoscopy (10/16/2020). Her family history is not on file.   She reports that she has quit smoking. She has never used smokeless tobacco. She reports that she does not currently use alcohol. She reports that she does not currently use drugs. Current Outpatient Medications   Medication Sig Dispense Refill   • estradiol (ESTRACE) 0.1 mg/g vaginal cream Compounded estradiol cream  0.01% one gram  2-3 times  weekly 30 g 3   • fluticasone (FLONASE) 50 mcg/act nasal spray 1 spray into each nostril daily prn     • valACYclovir (VALTREX) 1,000 mg tablet Take 1,000 mg by mouth as needed       No current facility-administered medications for this visit. She has No Known Allergies. .    Review of Systems:  All systems normal except as noted in HPI          Objective:  /62   Ht 5' 4.5" (1.638 m)   Wt 50.3 kg (111 lb)   LMP 10/16/2017   Breastfeeding No   BMI 18.76 kg/m²    Physical Exam  Vitals and nursing note reviewed. Constitutional:       Appearance: Normal appearance. HENT:      Head: Normocephalic. Cardiovascular:      Rate and Rhythm: Normal rate and regular rhythm. Pulses: Normal pulses. Heart sounds: Normal heart sounds. Pulmonary:      Effort: Pulmonary effort is normal.      Breath sounds: Normal breath sounds. Chest:      Chest wall: No mass, lacerations, swelling, tenderness or edema. Breasts: Rogers Score is 4. Breasts are symmetrical.      Right: Normal. No swelling, bleeding, inverted nipple, mass, nipple discharge, skin change or tenderness. Left: No swelling, bleeding, inverted nipple, mass, nipple discharge, skin change or tenderness. Abdominal:      General: Abdomen is flat. Bowel sounds are normal.      Palpations: Abdomen is soft. Genitourinary:     General: Normal vulva. Exam position: Lithotomy position. Pubic Area: No rash. Rogers stage (genital): 4.      Labia:         Right: No rash, tenderness or lesion. Left: No rash, tenderness or lesion.        Urethra: No urethral pain, urethral swelling or urethral lesion. Vagina: Tenderness present. No erythema or bleeding. Cervix: No cervical motion tenderness or discharge. Uterus: Normal.       Adnexa: Right adnexa normal and left adnexa normal.      Rectum: Normal.      Comments: Moderate atrophy noted    Musculoskeletal:         General: Normal range of motion. Cervical back: Neck supple. Lymphadenopathy:      Upper Body:      Right upper body: No supraclavicular, axillary or pectoral adenopathy. Left upper body: No supraclavicular, axillary or pectoral adenopathy. Lower Body: No right inguinal adenopathy. No left inguinal adenopathy. Skin:     General: Skin is warm and dry. Coloration: Skin is not ashen. Neurological:      General: No focal deficit present. Mental Status: She is alert.    Psychiatric:         Mood and Affect: Mood normal.

## 2023-11-07 LAB
CYTOLOGIST CVX/VAG CYTO: NORMAL
DX ICD CODE: NORMAL
HPV GENOTYPE REFLEX: NORMAL
HPV I/H RISK 4 DNA CVX QL PROBE+SIG AMP: NEGATIVE
OTHER STN SPEC: NORMAL
PATH REPORT.FINAL DX SPEC: NORMAL
SL AMB NOTE:: NORMAL
SL AMB SPECIMEN ADEQUACY: NORMAL
SL AMB TEST METHODOLOGY: NORMAL

## 2024-01-01 PROBLEM — Z12.4 CERVICAL CANCER SCREENING: Status: RESOLVED | Noted: 2023-11-02 | Resolved: 2024-01-01

## 2024-01-01 PROBLEM — Z01.419 ENCOUNTER FOR GYNECOLOGICAL EXAMINATION WITHOUT ABNORMAL FINDING: Status: RESOLVED | Noted: 2023-11-02 | Resolved: 2024-01-01

## 2024-11-07 ENCOUNTER — ANNUAL EXAM (OUTPATIENT)
Dept: OBGYN CLINIC | Facility: CLINIC | Age: 54
End: 2024-11-07
Payer: COMMERCIAL

## 2024-11-07 VITALS
HEIGHT: 65 IN | WEIGHT: 111 LBS | DIASTOLIC BLOOD PRESSURE: 60 MMHG | BODY MASS INDEX: 18.49 KG/M2 | SYSTOLIC BLOOD PRESSURE: 104 MMHG

## 2024-11-07 DIAGNOSIS — Z78.0 POSTMENOPAUSAL: ICD-10-CM

## 2024-11-07 DIAGNOSIS — N95.2 ATROPHIC VAGINITIS: ICD-10-CM

## 2024-11-07 DIAGNOSIS — Z12.4 CERVICAL CANCER SCREENING: ICD-10-CM

## 2024-11-07 DIAGNOSIS — R63.6 PATIENT UNDERWEIGHT: ICD-10-CM

## 2024-11-07 DIAGNOSIS — Z86.0100 HISTORY OF COLON POLYPS: ICD-10-CM

## 2024-11-07 DIAGNOSIS — Z12.31 ENCOUNTER FOR SCREENING MAMMOGRAM FOR MALIGNANT NEOPLASM OF BREAST: ICD-10-CM

## 2024-11-07 DIAGNOSIS — Z01.419 ENCOUNTER FOR GYNECOLOGICAL EXAMINATION WITHOUT ABNORMAL FINDING: Primary | ICD-10-CM

## 2024-11-07 PROCEDURE — 99396 PREV VISIT EST AGE 40-64: CPT | Performed by: OBSTETRICS & GYNECOLOGY

## 2024-11-07 RX ORDER — ESTRADIOL 10 UG/1
INSERT VAGINAL
Qty: 24 TABLET | Refills: 4 | Status: SHIPPED | OUTPATIENT
Start: 2024-11-07

## 2024-11-07 RX ORDER — ESTRADIOL 0.1 MG/G
CREAM VAGINAL
Qty: 30 G | Refills: 3 | Status: CANCELLED | OUTPATIENT
Start: 2024-11-07

## 2024-11-07 NOTE — PROGRESS NOTES
[facility] - [address], [address], [place] [address]    ASSESSMENT/PLAN: Kendra Mercado is a 54 y.o.  who presents for annual gynecologic exam.    Encounter for routine gynecologic examination  - Routine well woman exam completed today.  - Cervical Cancer Screening: Current ASCCP Guidelines reviewed. Last Pap: 2023 . Next Pap Due: today   - Breast Cancer Screening: Last Mammogram 2022, order given   - Colorectal cancer screening was not ordered.  - The following were reviewed in today's visit: breast self exam, mammography screening ordered, use and side effects of HRT, family planning choices, menopause, adequate intake of calcium and vitamin D, exercise, and healthy diet    Additional problems addressed during this visit:  1. Encounter for gynecological examination without abnormal finding  2. Encounter for screening mammogram for malignant neoplasm of breast  -     Mammo screening bilateral w 3d and cad; Future  3. Atrophic vaginitis  Comments:  Dw pt   lubricants  vs  hrt  vs moiturizers.  Friend doing well w Bonafide  Orders:  -     estradiol (VAGIFEM, YUVAFEM) 10 MCG TABS vaginal tablet; Place 1 tab vaginally every night for 2 weeks, then 2x each week at night.  -     IGP, Aptima HPV, Rfx 16/18,45  4. Patient underweight  Comments:  runner bone health reviewed  5. History of colon polyps  6. Postmenopausal  -     estradiol (VAGIFEM, YUVAFEM) 10 MCG TABS vaginal tablet; Place 1 tab vaginally every night for 2 weeks, then 2x each week at night.  -     IGP, Aptima HPV, Rfx 16/18,45  7. Cervical cancer screening  -     IGP, Aptima HPV, Rfx 16/18,45      CC:  Annual Gynecologic Examination    HPI: Kendra Mercado is a 54 y.o.  who presents for annual gynecologic examination.  53 yo   14 mo grandson here for wellness exam.  + runner   dw pt  under weight and risk of  fx.   No bleeding, bloating or satiety.   + dryness   not use estrace.  Will switch to    "Vagifem  2-3 x weekly.     The following portions of the patient's history were reviewed and updated as appropriate: She  has a past medical history of Herpes simplex, History of screening mammography (10/2024), History of screening mammography (08/12/2022), and Irritable bowel syndrome.  She  has a past surgical history that includes Liposuction; Colonoscopy (10/16/2020); and Upper gastrointestinal endoscopy (10/16/2020).  Her family history is not on file.  She  reports that she has quit smoking. She has never used smokeless tobacco. She reports that she does not currently use alcohol. She reports that she does not currently use drugs.  Current Outpatient Medications   Medication Sig Dispense Refill   • estradiol (VAGIFEM, YUVAFEM) 10 MCG TABS vaginal tablet Place 1 tab vaginally every night for 2 weeks, then 2x each week at night. 24 tablet 4   • fluticasone (FLONASE) 50 mcg/act nasal spray 1 spray into each nostril daily prn     • valACYclovir (VALTREX) 1,000 mg tablet Take 1,000 mg by mouth as needed       No current facility-administered medications for this visit.     She has No Known Allergies..    Review of Systems:  All systems normal except as noted in HPI          Objective:  /60 (BP Location: Right arm, Patient Position: Sitting, Cuff Size: Standard)   Ht 5' 4.5\" (1.638 m)   Wt 50.3 kg (111 lb)   LMP 10/16/2017   BMI 18.76 kg/m²    Physical Exam  Vitals and nursing note reviewed.   Constitutional:       Appearance: Normal appearance.   HENT:      Head: Normocephalic.   Cardiovascular:      Rate and Rhythm: Normal rate and regular rhythm.      Pulses: Normal pulses.      Heart sounds: Normal heart sounds.   Pulmonary:      Effort: Pulmonary effort is normal.      Breath sounds: Normal breath sounds.   Chest:      Chest wall: No mass, lacerations, swelling, tenderness or edema.   Breasts:     Rogers Score is 4.      Breasts are symmetrical.      Right: Normal. No swelling, bleeding, inverted " nipple, mass, nipple discharge, skin change or tenderness.      Left: No swelling, bleeding, inverted nipple, mass, nipple discharge, skin change or tenderness.   Abdominal:      General: Abdomen is flat. Bowel sounds are normal.      Palpations: Abdomen is soft.   Genitourinary:     General: Normal vulva.      Exam position: Lithotomy position.      Pubic Area: No rash.       Rogers stage (genital): 4.      Labia:         Right: No rash, tenderness or lesion.         Left: No rash, tenderness or lesion.       Urethra: No urethral pain, urethral swelling or urethral lesion.      Vagina: Normal.      Cervix: No cervical motion tenderness or discharge.      Uterus: Normal.       Adnexa: Right adnexa normal and left adnexa normal.      Rectum: Normal.      Comments: Atrophic    Musculoskeletal:         General: Normal range of motion.      Cervical back: Normal range of motion and neck supple.   Lymphadenopathy:      Upper Body:      Right upper body: No supraclavicular, axillary or pectoral adenopathy.      Left upper body: No supraclavicular, axillary or pectoral adenopathy.      Lower Body: No right inguinal adenopathy. No left inguinal adenopathy.   Skin:     General: Skin is warm and dry.   Neurological:      General: No focal deficit present.      Mental Status: She is alert and oriented to person, place, and time.   Psychiatric:         Mood and Affect: Mood normal.         Behavior: Behavior normal.         Thought Content: Thought content normal.         Judgment: Judgment normal.

## 2024-11-11 ENCOUNTER — TELEPHONE (OUTPATIENT)
Age: 54
End: 2024-11-11

## 2024-11-11 NOTE — TELEPHONE ENCOUNTER
Spoke with patient who reports the prescription for estradiol is $150 with her copay.  She would like to know if there is something else she could do instead.  She is going to contact her ins company and see what they do cover and cost and will let us know.

## 2024-11-15 ENCOUNTER — RESULTS FOLLOW-UP (OUTPATIENT)
Dept: OBGYN CLINIC | Facility: CLINIC | Age: 54
End: 2024-11-15

## 2024-12-07 PROBLEM — Z12.4 CERVICAL CANCER SCREENING: Status: RESOLVED | Noted: 2024-11-07 | Resolved: 2024-12-07

## 2024-12-07 PROBLEM — Z01.419 ENCOUNTER FOR GYNECOLOGICAL EXAMINATION WITHOUT ABNORMAL FINDING: Status: RESOLVED | Noted: 2024-11-07 | Resolved: 2024-12-07

## 2025-02-12 DIAGNOSIS — B00.9 HSV (HERPES SIMPLEX VIRUS) INFECTION: Primary | ICD-10-CM

## 2025-02-12 RX ORDER — VALACYCLOVIR HYDROCHLORIDE 1 G/1
1000 TABLET, FILM COATED ORAL AS NEEDED
Qty: 4 TABLET | Refills: 2 | Status: SHIPPED | OUTPATIENT
Start: 2025-02-12 | End: 2025-02-14

## 2025-02-12 NOTE — TELEPHONE ENCOUNTER
Patient called to request a refill for their Valcyclovir advised a refill was requested on 02/12/2025 and is pending approval. Patient verbalized understanding and is in agreement.     Does the patient have enough for 3 days?   [] Yes   [x] No - Send as HP to POD     Pt called back again because her cold sore is getting worse and she wants to make sure this is called in today if possible.

## 2025-02-12 NOTE — TELEPHONE ENCOUNTER
Reason for call:   [x] Refill   [] Prior Auth  [] Other:     Office:   [] PCP/Provider -   [x] Specialty/Provider - OBGYN    Medication: valACYclovir (VALTREX) 1,000 mg tablet     Dose/Frequency:     1,000 mg, Oral, As needed       Quantity:     Pharmacy: [facility]/pharmacy #[id number] - [place] - [address]     Does the patient have enough for 3 days?   [] Yes   [x] No - Send as HP to POD